# Patient Record
Sex: MALE
[De-identification: names, ages, dates, MRNs, and addresses within clinical notes are randomized per-mention and may not be internally consistent; named-entity substitution may affect disease eponyms.]

---

## 2018-03-13 ENCOUNTER — PRIOR ORIGINAL RECORDS (OUTPATIENT)
Dept: OTHER | Age: 68
End: 2018-03-13

## 2018-03-13 ENCOUNTER — HOSPITAL ENCOUNTER (EMERGENCY)
Facility: HOSPITAL | Age: 68
Discharge: HOME | End: 2018-03-13
Attending: EMERGENCY MEDICINE
Payer: MEDICARE

## 2018-03-13 VITALS
WEIGHT: 238 LBS | OXYGEN SATURATION: 93 % | TEMPERATURE: 97 F | HEIGHT: 69 IN | BODY MASS INDEX: 35.25 KG/M2 | HEART RATE: 72 BPM | RESPIRATION RATE: 18 BRPM | SYSTOLIC BLOOD PRESSURE: 130 MMHG | DIASTOLIC BLOOD PRESSURE: 89 MMHG

## 2018-03-13 DIAGNOSIS — I48.91 ATRIAL FIBRILLATION, NEW ONSET (HCC): Primary | ICD-10-CM

## 2018-03-13 LAB
ANION GAP SERPL CALC-SCNC: 10 MMOL/L (ref 0–18)
BUN SERPL-MCNC: 15 MG/DL (ref 8–20)
BUN/CREAT SERPL: 12.2 (ref 10–20)
CALCIUM SERPL-MCNC: 8.6 MG/DL (ref 8.5–10.5)
CHLORIDE SERPL-SCNC: 101 MMOL/L (ref 95–110)
CO2 SERPL-SCNC: 25 MMOL/L (ref 22–32)
CREAT SERPL-MCNC: 1.23 MG/DL (ref 0.5–1.5)
GLUCOSE SERPL-MCNC: 91 MG/DL (ref 70–99)
OSMOLALITY UR CALC.SUM OF ELEC: 282 MOSM/KG (ref 275–295)
POTASSIUM SERPL-SCNC: 3.9 MMOL/L (ref 3.3–5.1)
SODIUM SERPL-SCNC: 136 MMOL/L (ref 136–144)

## 2018-03-13 PROCEDURE — 93005 ELECTROCARDIOGRAM TRACING: CPT

## 2018-03-13 PROCEDURE — 85025 COMPLETE CBC W/AUTO DIFF WBC: CPT | Performed by: EMERGENCY MEDICINE

## 2018-03-13 PROCEDURE — 99284 EMERGENCY DEPT VISIT MOD MDM: CPT

## 2018-03-13 PROCEDURE — 93010 ELECTROCARDIOGRAM REPORT: CPT | Performed by: EMERGENCY MEDICINE

## 2018-03-13 PROCEDURE — 85060 BLOOD SMEAR INTERPRETATION: CPT | Performed by: EMERGENCY MEDICINE

## 2018-03-13 PROCEDURE — 36415 COLL VENOUS BLD VENIPUNCTURE: CPT

## 2018-03-13 PROCEDURE — 80048 BASIC METABOLIC PNL TOTAL CA: CPT | Performed by: EMERGENCY MEDICINE

## 2018-03-13 NOTE — ED INITIAL ASSESSMENT (HPI)
Pt reports he was sent here from his pmd for possible new onset of a-fib, pt has no complaints at this time other than being tired more than usual. He states that he has a uri and went to his pmd for a recheck where this diagnosed him with this new onset a

## 2018-03-14 LAB
BASOPHILS # BLD: 0.1 K/UL (ref 0–0.2)
BASOPHILS NFR BLD: 1 %
EOSINOPHIL # BLD: 0.5 K/UL (ref 0–0.7)
EOSINOPHIL NFR BLD: 4 %
ERYTHROCYTE [DISTWIDTH] IN BLOOD BY AUTOMATED COUNT: 13.7 % (ref 11–15)
HCT VFR BLD AUTO: 50.1 % (ref 41–52)
HGB BLD-MCNC: 16.8 G/DL (ref 13.5–17.5)
LYMPHOCYTES # BLD: 6.2 K/UL (ref 1–4)
LYMPHOCYTES NFR BLD: 46 %
MCH RBC QN AUTO: 31 PG (ref 27–32)
MCHC RBC AUTO-ENTMCNC: 33.5 G/DL (ref 32–37)
MCV RBC AUTO: 92.5 FL (ref 80–100)
MONOCYTES # BLD: 0.9 K/UL (ref 0–1)
MONOCYTES NFR BLD: 7 %
NEUTROPHILS # BLD AUTO: 5.8 K/UL (ref 1.8–7.7)
NEUTROPHILS NFR BLD: 43 %
PLATELET # BLD AUTO: 234 K/UL (ref 140–400)
PMV BLD AUTO: 8.7 FL (ref 7.4–10.3)
RBC # BLD AUTO: 5.41 M/UL (ref 4.5–5.9)
WBC # BLD AUTO: 13.5 K/UL (ref 4–11)

## 2018-03-14 NOTE — ED NOTES
Report received from otm Mae. Pt appears comfortable at this time. Pt denies chest pain, sob/dyspnea, palpitations, dizziness. Pt denies additional needs at this time. Pt states that he was sent from his primary care doctor after a routine checkup.  Will

## 2018-03-14 NOTE — CM/SW NOTE
Per Dr. John Amaro request pt provided with Xarelto coupon 30 day trial, pt verbalized understanding.

## 2018-03-14 NOTE — ED NOTES
Lab called with cbc differential being sent for path review, dr Naranjo Profit notified.  Neutrophil 43 lymphocyte 45.6 monocyte 6.9 eosinophil 3.8

## 2018-03-14 NOTE — ED PROVIDER NOTES
Patient Seen in: [facility] AND [facility]    History   Patient presents with:  Arrythmia/Palpitations (cardiovascular)      HPI    Patient presents to the ED after his doctor did an EKG at his clinic appointment today noted new onset atrial exhibits no discharge. Neck: No tracheal deviation present. Cardiovascular: Normal rate and intact distal pulses. Irregular rhythm   Pulmonary/Chest: Effort normal. No stridor. No respiratory distress. Abdominal: Soft. He exhibits no distension. fibrillation, with ventricular rate of 70    Differential Diagnosis/ Diagnostic Considerations: New onset atrial fibrillation    Medical Record Review: I personally reviewed available prior medical records for any recent pertinent discharge summaries, test

## 2018-03-15 LAB
BUN: 15 MG/DL
CALCIUM: 8.6 MG/DL
CHLORIDE: 101 MEQ/L
CREATININE, SERUM: 1.23 MG/DL
GLUCOSE: 91 MG/DL
HEMATOCRIT: 50.1 %
HEMOGLOBIN: 16.8 G/DL
PLATELETS: 234 K/UL
POTASSIUM, SERUM: 3.9 MEQ/L
RED BLOOD COUNT: 5.41 X 10-6/U
SODIUM: 136 MEQ/L
WHITE BLOOD COUNT: 13.5 X 10-3/U

## 2018-03-16 ENCOUNTER — MYAURORA ACCOUNT LINK (OUTPATIENT)
Dept: OTHER | Age: 68
End: 2018-03-16

## 2018-03-16 ENCOUNTER — PRIOR ORIGINAL RECORDS (OUTPATIENT)
Dept: OTHER | Age: 68
End: 2018-03-16

## 2018-03-28 ENCOUNTER — HOSPITAL ENCOUNTER (OUTPATIENT)
Dept: CV DIAGNOSTICS | Facility: HOSPITAL | Age: 68
Discharge: HOME | End: 2018-03-28
Attending: INTERNAL MEDICINE
Payer: MEDICARE

## 2018-03-28 ENCOUNTER — HOSPITAL ENCOUNTER (OUTPATIENT)
Dept: NUCLEAR MEDICINE | Facility: HOSPITAL | Age: 68
Discharge: HOME | End: 2018-03-28
Attending: INTERNAL MEDICINE
Payer: MEDICARE

## 2018-03-28 DIAGNOSIS — I10 HYPERTENSION: ICD-10-CM

## 2018-03-28 DIAGNOSIS — I48.91 ATRIAL FIBRILLATION (HCC): ICD-10-CM

## 2018-03-28 DIAGNOSIS — I48.19 ATRIAL FIBRILLATION, PERSISTENT (HCC): ICD-10-CM

## 2018-03-28 DIAGNOSIS — R06.00 DYSPNEA: ICD-10-CM

## 2018-03-28 PROCEDURE — 93306 TTE W/DOPPLER COMPLETE: CPT | Performed by: INTERNAL MEDICINE

## 2018-03-28 PROCEDURE — 78451 HT MUSCLE IMAGE SPECT SING: CPT | Performed by: INTERNAL MEDICINE

## 2018-03-28 RX ORDER — SODIUM CHLORIDE 9 MG/ML
INJECTION, SOLUTION INTRAVENOUS
Status: DISPENSED
Start: 2018-03-28 | End: 2018-03-29

## 2018-03-30 ENCOUNTER — PRIOR ORIGINAL RECORDS (OUTPATIENT)
Dept: OTHER | Age: 68
End: 2018-03-30

## 2018-04-06 ENCOUNTER — PRIOR ORIGINAL RECORDS (OUTPATIENT)
Dept: OTHER | Age: 68
End: 2018-04-06

## 2018-07-24 ENCOUNTER — PRIOR ORIGINAL RECORDS (OUTPATIENT)
Dept: OTHER | Age: 68
End: 2018-07-24

## 2018-10-10 ENCOUNTER — PRIOR ORIGINAL RECORDS (OUTPATIENT)
Dept: OTHER | Age: 68
End: 2018-10-10

## 2018-10-12 ENCOUNTER — PRIOR ORIGINAL RECORDS (OUTPATIENT)
Dept: OTHER | Age: 68
End: 2018-10-12

## 2018-10-12 ENCOUNTER — MYAURORA ACCOUNT LINK (OUTPATIENT)
Dept: OTHER | Age: 68
End: 2018-10-12

## 2018-10-16 ENCOUNTER — HOSPITAL ENCOUNTER (OUTPATIENT)
Dept: ULTRASOUND IMAGING | Facility: HOSPITAL | Age: 68
Discharge: HOME | End: 2018-10-16
Attending: INTERNAL MEDICINE
Payer: MEDICARE

## 2018-10-16 DIAGNOSIS — R94.31 ABNORMAL EKG: ICD-10-CM

## 2018-10-16 DIAGNOSIS — I10 BENIGN HYPERTENSION: ICD-10-CM

## 2018-10-16 DIAGNOSIS — R60.9 EDEMA, UNSPECIFIED TYPE: ICD-10-CM

## 2018-10-16 DIAGNOSIS — I73.9 CLAUDICATION, INTERMITTENT (HCC): ICD-10-CM

## 2018-10-16 PROCEDURE — 93971 EXTREMITY STUDY: CPT | Performed by: INTERNAL MEDICINE

## 2018-10-17 ENCOUNTER — PRIOR ORIGINAL RECORDS (OUTPATIENT)
Dept: OTHER | Age: 68
End: 2018-10-17

## 2018-10-18 ENCOUNTER — APPOINTMENT (OUTPATIENT)
Dept: NUCLEAR MEDICINE | Facility: HOSPITAL | Age: 68
End: 2018-10-18
Attending: INTERNAL MEDICINE
Payer: MEDICARE

## 2018-10-18 ENCOUNTER — HOSPITAL ENCOUNTER (OUTPATIENT)
Dept: NUCLEAR MEDICINE | Facility: HOSPITAL | Age: 68
Discharge: HOME | End: 2018-10-19
Attending: INTERNAL MEDICINE
Payer: MEDICARE

## 2018-10-18 ENCOUNTER — HOSPITAL ENCOUNTER (OUTPATIENT)
Dept: CV DIAGNOSTICS | Facility: HOSPITAL | Age: 68
Discharge: HOME | End: 2018-10-18
Attending: INTERNAL MEDICINE
Payer: MEDICARE

## 2018-10-18 DIAGNOSIS — R60.9 EDEMA: ICD-10-CM

## 2018-10-18 DIAGNOSIS — R94.31 ABNORMAL ELECTROCARDIOGRAM: ICD-10-CM

## 2018-10-18 DIAGNOSIS — I73.9 CLAUDICATION, INTERMITTENT (HCC): ICD-10-CM

## 2018-10-18 PROCEDURE — 78452 HT MUSCLE IMAGE SPECT MULT: CPT | Performed by: INTERNAL MEDICINE

## 2018-10-18 PROCEDURE — 93017 CV STRESS TEST TRACING ONLY: CPT | Performed by: INTERNAL MEDICINE

## 2018-10-19 ENCOUNTER — HOSPITAL ENCOUNTER (OUTPATIENT)
Dept: CV DIAGNOSTICS | Facility: HOSPITAL | Age: 68
Discharge: HOME | End: 2018-10-19
Attending: INTERNAL MEDICINE
Payer: MEDICARE

## 2018-10-19 PROCEDURE — 93018 CV STRESS TEST I&R ONLY: CPT | Performed by: INTERNAL MEDICINE

## 2018-10-19 PROCEDURE — 78452 HT MUSCLE IMAGE SPECT MULT: CPT | Performed by: INTERNAL MEDICINE

## 2018-10-19 PROCEDURE — A4216 STERILE WATER/SALINE, 10 ML: HCPCS

## 2018-10-19 PROCEDURE — 93016 CV STRESS TEST SUPVJ ONLY: CPT | Performed by: INTERNAL MEDICINE

## 2018-10-19 PROCEDURE — 93017 CV STRESS TEST TRACING ONLY: CPT | Performed by: INTERNAL MEDICINE

## 2018-10-19 RX ORDER — 0.9 % SODIUM CHLORIDE 0.9 %
VIAL (ML) INJECTION
Status: DISPENSED
Start: 2018-10-19 | End: 2018-10-20

## 2018-10-19 RX ORDER — 0.9 % SODIUM CHLORIDE 0.9 %
VIAL (ML) INJECTION
Status: DISCONTINUED
Start: 2018-10-19 | End: 2018-10-19 | Stop reason: WASHOUT

## 2018-10-23 ENCOUNTER — PRIOR ORIGINAL RECORDS (OUTPATIENT)
Dept: OTHER | Age: 68
End: 2018-10-23

## 2018-10-26 ENCOUNTER — PRIOR ORIGINAL RECORDS (OUTPATIENT)
Dept: OTHER | Age: 68
End: 2018-10-26

## 2018-10-26 ENCOUNTER — APPOINTMENT (OUTPATIENT)
Dept: LAB | Facility: HOSPITAL | Age: 68
End: 2018-10-26
Attending: INTERNAL MEDICINE
Payer: MEDICARE

## 2018-10-26 DIAGNOSIS — I48.91 ATRIAL FIBRILLATION, UNSPECIFIED TYPE (HCC): ICD-10-CM

## 2018-10-26 PROCEDURE — 36415 COLL VENOUS BLD VENIPUNCTURE: CPT

## 2018-10-26 PROCEDURE — 80048 BASIC METABOLIC PNL TOTAL CA: CPT

## 2018-10-29 LAB
BUN: 15 MG/DL
CALCIUM: 9.1 MG/DL
CHLORIDE: 101 MEQ/L
CREATININE, SERUM: 1.1 MG/DL
GLUCOSE: 133 MG/DL
POTASSIUM, SERUM: 3.4 MEQ/L
SODIUM: 137 MEQ/L

## 2018-11-06 ENCOUNTER — PRIOR ORIGINAL RECORDS (OUTPATIENT)
Dept: OTHER | Age: 68
End: 2018-11-06

## 2018-11-06 RX ORDER — METOPROLOL SUCCINATE 50 MG/1
50 TABLET, EXTENDED RELEASE ORAL DAILY
COMMUNITY

## 2018-11-06 RX ORDER — AMLODIPINE BESYLATE 10 MG/1
10 TABLET ORAL DAILY
COMMUNITY

## 2018-11-06 RX ORDER — LOSARTAN POTASSIUM AND HYDROCHLOROTHIAZIDE 25; 100 MG/1; MG/1
1 TABLET ORAL DAILY
COMMUNITY

## 2018-11-07 ENCOUNTER — PRIOR ORIGINAL RECORDS (OUTPATIENT)
Dept: OTHER | Age: 68
End: 2018-11-07

## 2018-11-07 ENCOUNTER — HOSPITAL ENCOUNTER (OUTPATIENT)
Dept: INTERVENTIONAL RADIOLOGY/VASCULAR | Facility: HOSPITAL | Age: 68
Discharge: HOME | End: 2018-11-07
Attending: INTERNAL MEDICINE | Admitting: INTERNAL MEDICINE
Payer: MEDICARE

## 2018-11-07 VITALS
HEART RATE: 61 BPM | SYSTOLIC BLOOD PRESSURE: 121 MMHG | HEIGHT: 70 IN | WEIGHT: 251 LBS | OXYGEN SATURATION: 98 % | RESPIRATION RATE: 19 BRPM | BODY MASS INDEX: 35.93 KG/M2 | DIASTOLIC BLOOD PRESSURE: 87 MMHG

## 2018-11-07 DIAGNOSIS — I48.91 A-FIB (HCC): ICD-10-CM

## 2018-11-07 PROCEDURE — 93005 ELECTROCARDIOGRAM TRACING: CPT

## 2018-11-07 PROCEDURE — 5A2204Z RESTORATION OF CARDIAC RHYTHM, SINGLE: ICD-10-PCS | Performed by: INTERNAL MEDICINE

## 2018-11-07 PROCEDURE — 92960 CARDIOVERSION ELECTRIC EXT: CPT

## 2018-11-07 PROCEDURE — 93010 ELECTROCARDIOGRAM REPORT: CPT | Performed by: INTERNAL MEDICINE

## 2018-11-07 PROCEDURE — 84132 ASSAY OF SERUM POTASSIUM: CPT | Performed by: INTERNAL MEDICINE

## 2018-11-07 RX ORDER — SODIUM CHLORIDE 0.9 % (FLUSH) 0.9 %
10 SYRINGE (ML) INJECTION AS NEEDED
Status: DISCONTINUED | OUTPATIENT
Start: 2018-11-07 | End: 2018-11-07

## 2018-11-07 RX ORDER — SODIUM CHLORIDE 9 MG/ML
INJECTION, SOLUTION INTRAVENOUS
Status: COMPLETED
Start: 2018-11-07 | End: 2018-11-07

## 2018-11-07 RX ORDER — SODIUM CHLORIDE 9 MG/ML
INJECTION, SOLUTION INTRAVENOUS CONTINUOUS
Status: DISCONTINUED | OUTPATIENT
Start: 2018-11-07 | End: 2018-11-07

## 2018-11-07 RX ADMIN — SODIUM CHLORIDE: 9 INJECTION, SOLUTION INTRAVENOUS at 08:30:00

## 2018-11-07 NOTE — PROGRESS NOTES
Patient and his grandaughter given discharge and follow-up instructions. All questions answered. Patient denies complaints. Patient VS stable, tolerating PO, and ambulating without difficulty.

## 2018-11-07 NOTE — INTERVAL H&P NOTE
Pre-op Diagnosis: * No pre-op diagnosis entered *    The above referenced H&P was reviewed by Julia Kat MD on 11/7/2018, the patient was examined and no significant changes have occurred in the patient's condition since the H&P was performed.   I discusse

## 2018-11-07 NOTE — PROCEDURES
PROCEDURE(S) PERFORMED:    1. Cardioversion. 2.     Sedation     :  Renetta Conley MD    ANESTHESIA:  IV sedation. Moderate conscious sedation for this procedure was administered and personally monitored from 9:30 until 9:37.      INDICATION:  Per

## 2018-11-08 LAB — POTASSIUM, SERUM: 3.6 MEQ/L

## 2018-11-15 ENCOUNTER — OFFICE VISIT (OUTPATIENT)
Dept: CARDIOLOGY CLINIC | Facility: HOSPITAL | Age: 68
End: 2018-11-15
Attending: INTERNAL MEDICINE
Payer: MEDICARE

## 2018-11-15 VITALS
BODY MASS INDEX: 36 KG/M2 | SYSTOLIC BLOOD PRESSURE: 123 MMHG | DIASTOLIC BLOOD PRESSURE: 66 MMHG | HEART RATE: 52 BPM | WEIGHT: 253 LBS | OXYGEN SATURATION: 97 %

## 2018-11-15 DIAGNOSIS — I48.19 ATRIAL FIBRILLATION, PERSISTENT (HCC): Primary | Chronic | ICD-10-CM

## 2018-11-15 DIAGNOSIS — F17.200 TOBACCO USE DISORDER: ICD-10-CM

## 2018-11-15 DIAGNOSIS — Z98.890 HISTORY OF CARDIOVERSION: ICD-10-CM

## 2018-11-15 DIAGNOSIS — I10 HTN (HYPERTENSION), BENIGN: ICD-10-CM

## 2018-11-15 DIAGNOSIS — I48.91 ATRIAL FIBRILLATION (HCC): ICD-10-CM

## 2018-11-15 DIAGNOSIS — Z71.6 ENCOUNTER FOR TOBACCO USE CESSATION COUNSELING: ICD-10-CM

## 2018-11-15 PROBLEM — I25.10 CORONARY ARTERY DISEASE INVOLVING NATIVE CORONARY ARTERY OF NATIVE HEART WITHOUT ANGINA PECTORIS: Chronic | Status: ACTIVE | Noted: 2018-11-15

## 2018-11-15 PROBLEM — I21.4 NSTEMI (NON-ST ELEVATED MYOCARDIAL INFARCTION) (HCC): Chronic | Status: ACTIVE | Noted: 2018-11-15

## 2018-11-15 PROBLEM — Z92.89 HISTORY OF CARDIOVERSION: Status: ACTIVE | Noted: 2018-11-07

## 2018-11-15 PROBLEM — I73.9 CLAUDICATION OF BOTH LOWER EXTREMITIES (HCC): Chronic | Status: ACTIVE | Noted: 2018-11-15

## 2018-11-15 PROCEDURE — 93010 ELECTROCARDIOGRAM REPORT: CPT | Performed by: NURSE PRACTITIONER

## 2018-11-15 PROCEDURE — 99214 OFFICE O/P EST MOD 30 MIN: CPT | Performed by: NURSE PRACTITIONER

## 2018-11-15 PROCEDURE — 99212 OFFICE O/P EST SF 10 MIN: CPT | Performed by: NURSE PRACTITIONER

## 2018-11-15 PROCEDURE — 93005 ELECTROCARDIOGRAM TRACING: CPT

## 2018-11-15 RX ORDER — NICOTINE 21 MG/24HR
1 PATCH, TRANSDERMAL 24 HOURS TRANSDERMAL EVERY 24 HOURS
Qty: 14 PATCH | Refills: 0 | Status: SHIPPED | OUTPATIENT
Start: 2018-11-15

## 2018-11-15 RX ORDER — ICOSAPENT ETHYL 1000 MG/1
1 CAPSULE ORAL DAILY
COMMUNITY

## 2018-11-15 RX ORDER — ATORVASTATIN CALCIUM 40 MG/1
40 TABLET, FILM COATED ORAL NIGHTLY
COMMUNITY

## 2018-11-15 RX ORDER — CHLORAL HYDRATE 500 MG
1 CAPSULE ORAL DAILY
COMMUNITY

## 2018-11-15 NOTE — PATIENT INSTRUCTIONS
Stop smoking and begin nicotine patch 21 m/24 hr patch daily for 2 weeks then decrease to nicotine 14 mg/24 hr patch for 2-4 weeks then nicotine 7 mg/24 hr patch for 2-4 weeks.      Continue all your same medications    Call if having any dizziness, lighthe corazón puede bombear hacia el cuerpo. La fibrilación auricular puede ocurrir jaz vez cada tanto e irse por sí tracie. O puede continuar por períodos [employer] y requerir tratamiento.   La fibrilación auricular puede causar problemas graves, tales kvng un a Iris Han son los síntomas de la fibrilación auricular? La fibrilación auricular puede causar síntomas o no.  Cuando hay síntomas, pueden incluir:  · Latidos irregulares, más kevin o más rápidos que lo normal  · Dificultad para respirar  · [place] incluir lo siguiente:  · Coágulos de gerardo  · Ataque cerebral  · Carlos Eduardo Grooms. Joyce problema se da cuando el músculo del corazón se debilita tanto que ya no puede bombear theresa la gerardo  ¿Cuándo marquis llamar a mi proveedor de [facility]?   Ll agregada  Evite comer:  · Fiambres o embutidos curados o ahumados  · Queso  · Jugo de tomate y ketchup  · Vegetales, pescado y sopas de gualberto que no indiquen que son reducidos en sodio o que no tienen sal agregada  · Salsas empacadas  · [place], pickles y de alimentación. · Coma menos grasas saturadas y grasas trans. Las grasas saturadas elevan shantel niveles de colesterol, por eso, coma lo menos posible de estas grasas.  Están en alimentos tales kvng las raudel grasas, la [place], el queso entero y los a menos grasas. Son buenas opciones el pescado, el charlene sin piel y los frijoles. · Productos lácteos bajos en grasa o sin grasa: ofrecen nutrientes sin mucha grasa. Pruebe consumir Suzy Smaller yogur bajos en grasa o sin grasa.   · Grasas saludables: pued aderezos sin grasa para ensaladas y vinagre. Si desea hierbas y especias sin yuly, pruebe chuckaca, cilkiaro, Isabel Los y rosenthal.   Date Last Reviewed: 6/1/2013  © 6743-1182 [employer]. [address] [facility], [address]12 [facility].  [place] obligan a latir muy rápido y de forma irregular. · Las aurículas laten tan rápido y de forma tan irregular que pueden simplemente temblar sin llegar a contraerse.  Si las aurículas no se contraen, no mueven suficiente [employer] cavidades inferiores especialice en ayudar a la gente a dejar de fumar.   · Consulte con matamoros médico acerca de los sustitutos de la nicotina y medicamentos con receta que pueden ayudarle a dejar de fumar.   Fije jaz fecha para dejarlo  · Elija jaz fecha en las [address] a cuat oxígeno [employer], ana parte del músculo muere. Todo matamoros cuerpo está en riesgo  La acumulación de placa puede generar problemas en todo matamoros cuerpo.  Los lugares donde comúnmente puede effie problemas con las arterias incluyen:  · El cerebro. Las arter

## 2018-11-15 NOTE — PROGRESS NOTES
[place] Patient Status:  Outpatient    1950 MRN [id number]   Location Daniel Cedeño MD, MD Cindy Schmid MD       Tamara Sanders is a 75year old male who presents to clinic Clinical weights: 1) 253  DC wt:  11.7 .18 @  251  Home weights:  1) not checking    General appearance: alert, appears stated age and cooperative  Neck: no JVD  Lungs: clear to auscultation bilaterally  Heart: S1, S2 normal, no murmur, click, rub or hernandez Call if having any dizziness, lightheadedness, heart racing, palpitations, chest pain, shortness of breath, coughing, swelling, weight gain, fever, chills or worsening symptoms.      Weigh yourself daily in the morning before breakfast, call if gaining 3 lb La fibrilación auricular puede causar problemas graves, tales kvng un ataque cerebral. Irene proveedor de atención médica deberá vigilarla y [facility]. ¿Qué sucede carol la fibrilación auricular?   El corazón posee un sistema eléctrico que envía señales par · Latidos irregulares, más kevin o más rápidos que lo normal  · Dificultad para respirar  · Cansancio  · Mareo o desmayos  · Dolor en el marcus  ¿Cómo se trata la fibrilación auricular?   Los tratamientos para la fibrilación auricular pueden incluir cualqui · [facility]. Joyce problema se da cuando el músculo del corazón se debilita tanto que ya no puede bombear theresa la gerardo  ¿Cuándo marquis llamar a mi proveedor de [facility]?   Llame a matamoros proveedor de [facility] de inmediato si nota algun · Queso  · Jugo de tomate y ketchup  · Vegetales, pescado y sopas de gualberto que no indiquen que son reducidos en sodio o que no tienen sal agregada  · Salsas empacadas  · [place], pickles y pepinillos en conserva  · Aderezos para ensalada embotellados  Par · Coma menos grasas saturadas y grasas trans. Las grasas saturadas elevan shantel niveles de colesterol, por eso, coma lo menos posible de estas grasas.  Están en alimentos tales kvng las raudel grasas, la [place], el queso entero y los aceites de guido y · Proteínas magras: le aportan nutrición con menos grasas. Son buenas opciones el pescado, el charlene sin piel y los frijoles. · Productos lácteos bajos en grasa o sin grasa: ofrecen nutrientes sin mucha grasa.  Pruebe consumir Clemon Moras o yogur bajos en · Elija ingredientes que le den sabor a shantel comidas sin cargarla con calorías, grasa o sodio. Pruebe los siguientes ingredientes: rábano picante, salsa picante, jose alfredo, mostaza, aderezos sin grasa para ensaladas y vinagre.  Si desea hierbas y especias sin sa · El aleteo auricular puede causar síntomas similares a los de la fibrilación auricular. Además, puede producir ritmos aun más rápidos e irregulares en la fibrilación auricular.   Fibrilación auricular  En la fibrilación auricular, las células de las aurícu Dejar de fumar es jaz de las mejores cosas que puede hacer para evitar que matamoros trastorno cardíaco se agrave.  Fumar disminuye el volumen de oxígeno que llega al corazón, acelera la acumulación de placa y aumenta el riesgo de un ataque al corazón, May lla © 7764-8396 [employer]. 1407 [facility], [address]12 [facility]. Todos los derechos reservados. Esta información no pretende sustituir la atención médica profesional. Sólo matamoros médico puede diagnosticar y tratar un problema de jerad. · Las piernas. Si las arterias de las piernas se obstruyen con placa, puede sentir calambres o sincere en los glúteos, los muslos o las pantorrillas al caminar.  A esto se le llama “claudicación”.   Date Last Reviewed: 6/1/2013  © 4581-0871 [employer]

## 2019-02-28 VITALS
WEIGHT: 238 LBS | SYSTOLIC BLOOD PRESSURE: 110 MMHG | DIASTOLIC BLOOD PRESSURE: 80 MMHG | BODY MASS INDEX: 34.07 KG/M2 | HEIGHT: 70 IN | OXYGEN SATURATION: 98 % | HEART RATE: 77 BPM

## 2019-02-28 VITALS
WEIGHT: 251 LBS | HEART RATE: 94 BPM | BODY MASS INDEX: 35.93 KG/M2 | DIASTOLIC BLOOD PRESSURE: 60 MMHG | HEIGHT: 70 IN | SYSTOLIC BLOOD PRESSURE: 116 MMHG

## 2019-08-21 RX ORDER — RIVAROXABAN 20 MG/1
TABLET, FILM COATED ORAL
Qty: 30 TABLET | Refills: 0 | Status: SHIPPED | OUTPATIENT
Start: 2019-08-21 | End: 2021-05-21 | Stop reason: CLARIF

## 2023-03-15 ENCOUNTER — OFFICE VISIT (OUTPATIENT)
Dept: SURGERY | Facility: CLINIC | Age: 73
End: 2023-03-15

## 2023-03-15 DIAGNOSIS — R31.0 GROSS HEMATURIA: Primary | ICD-10-CM

## 2023-03-15 PROCEDURE — 99203 OFFICE O/P NEW LOW 30 MIN: CPT | Performed by: UROLOGY

## 2023-03-16 LAB — NON GYNE INTERPRETATION: NEGATIVE

## 2023-03-17 ENCOUNTER — HOSPITAL ENCOUNTER (OUTPATIENT)
Dept: CT IMAGING | Age: 73
Discharge: HOME | End: 2023-03-17
Attending: UROLOGY
Payer: MEDICARE

## 2023-03-17 DIAGNOSIS — R31.0 GROSS HEMATURIA: ICD-10-CM

## 2023-03-17 LAB
CREAT BLD-MCNC: 1.4 MG/DL
GFR SERPLBLD BASED ON 1.73 SQ M-ARVRAT: 53 ML/MIN/1.73M2 (ref 60–?)

## 2023-03-17 PROCEDURE — 82565 ASSAY OF CREATININE: CPT

## 2023-03-17 PROCEDURE — 74178 CT ABD&PLV WO CNTR FLWD CNTR: CPT | Performed by: UROLOGY

## 2023-03-17 PROCEDURE — 76377 3D RENDER W/INTRP POSTPROCES: CPT | Performed by: UROLOGY

## 2023-03-21 ENCOUNTER — TELEPHONE (OUTPATIENT)
Dept: SURGERY | Facility: CLINIC | Age: 73
End: 2023-03-21

## 2023-03-21 ENCOUNTER — PROCEDURE (OUTPATIENT)
Dept: SURGERY | Facility: CLINIC | Age: 73
End: 2023-03-21

## 2023-03-21 VITALS — SYSTOLIC BLOOD PRESSURE: 116 MMHG | DIASTOLIC BLOOD PRESSURE: 80 MMHG | HEART RATE: 101 BPM

## 2023-03-21 DIAGNOSIS — Z51.81 MONITORING FOR ANTICOAGULANT USE: ICD-10-CM

## 2023-03-21 DIAGNOSIS — Z01.818 PREOP EXAMINATION: ICD-10-CM

## 2023-03-21 DIAGNOSIS — R31.0 GROSS HEMATURIA: Primary | ICD-10-CM

## 2023-03-21 DIAGNOSIS — D49.4 BLADDER TUMOR: Primary | ICD-10-CM

## 2023-03-21 DIAGNOSIS — Z79.01 MONITORING FOR ANTICOAGULANT USE: ICD-10-CM

## 2023-03-21 DIAGNOSIS — R39.89 OTHER SYMPTOMS AND SIGNS INVOLVING THE GENITOURINARY SYSTEM: ICD-10-CM

## 2023-03-21 PROCEDURE — 52000 CYSTOURETHROSCOPY: CPT | Performed by: UROLOGY

## 2023-03-21 NOTE — PROCEDURES
CYSTOSCOPY (MALE)    PRE-OP DIAGNOSIS: hematuria    POST-OP DIAGNOSIS: stefano    PROCEDURE: Cystsocopy    SURGEON: Garima Orellana MD    ASSISTANT: none     EBL: minimal    FINDINGS:   1. Urethra: No meatal stenosis, no anterior or posterior urethral strictures, open bladder neck, papillary lesion emerging from the bladder neck/proximal urethra into the bladder   2. Prostate: Bilobar coaptation, minimal  3. Bladder: Bilateral ureteral orifices in orthotopic position with efflux, no suspicious or concerning erythematous lesions, no papillary bladder tumors, no stones other than papillary lesion emerging from the bladder neck/proximal urethra into the bladder   4. Retroflexion: papillary lesion emerging from the bladder neck/proximal urethra into the bladder   5. Other findings: none    INDICATIONS: gross hematuria. Presents for cysto. CTU with irregular lesion on posterior bladder wall. Cytology benign. PROCEDURE: Patient was brought to the procedure suite and a timeout was performed identifying the patient,  and procedure being performed. The risks of the procedure were once again detailed to the patient including bleeding, infection, dysuria. The patient agreed to proceed. The patient had a negative urinalysis. No antibiotics were given to patient prior to this procedure. He was placed in a supine position on the table and a flexible cystoscope was inserted per urethra. There were no obvious urethral strictures in the anterior, membranous or posterior urethra. The prostate appeared small and nonobstructive. Once in the bladder we performed a full diagnostic/surveillance cystoscopy which demonstrated papillary lesion emerging from the bladder neck/proximal urethra into the bladder. On retroflexion we noted the same lesion. The scope was then carefully removed and once again no urethral abnormalities were noted.     There were no complications after this procedure and the patient tolerated the procedure without issue. IMPRESSION: cysto with papillary lesion emerging from the bladder neck/proximal urethra into the bladder . CTU with filling defect at the bladder neck. Cytology benign. PLAN:    1. OR: TURBT, fulguration  2. CBC, CHEM7, INR, urine culture  3. Hold xarelto, PCP clearance    Discussed risks of procedure including bleeding, infection, damage to surrounding structures (urethra, bladder, penis etc), need for additional procedures, need for prolonged catheter, bladder spasms/pain, anesthesia complications, sepsis, death, infection.

## 2023-03-21 NOTE — TELEPHONE ENCOUNTER
Hi!  This patient needs a TURBT. Should be booked for 1.5 hours in the next 1-2 weeks. Needs labs as ordered below. Needs PCP clerance with instructions on holding his Xarelto. Thanks! [address] [place] [address]    Urology Surgery Scheduling Request    Location: [address] [place] [address]    Surgeon: Claritza Vargas MD    Asst. Surgeon: N/A    Diagnosis: Bladder tumor    Procedure: Cystoscopy, transurethral resection of bladder tumor, fulguration    Anesthesia: General     Time Frame: next 1-2 weeks    Time needed: 1.5 hours    Special Equipment: bipolar resectoscope    On Call to OR: ancef 2g    Admission: Day Surgery    Pre-op Testing: CBC, CMP, PT/INR and Urine Culture     Need Pre-op Clearance: PCP clearance -needs instructions on when to hold xarelto    Estimated Post Op/Follow Up Appt: kita Acosta MD

## 2023-03-27 NOTE — TELEPHONE ENCOUNTER
Spoke with patient' daughter Дмитрий Rogers, scheduled Cystoscopy, transurethral resection of bladder tumor, fulguration, Thursday 04/20/2023    I will mail pre-op/lab instructions out, once received, they will call office, please transfer call to [phone], thanks anju      Faxed clearance to Dwayne Isidro, asking how long patient can hold Xarelto, prior to scheduled surgery, I will await recommendations.

## 2023-03-27 NOTE — ADDENDUM NOTE
Addended by: Deepthi [facility] on: 3/27/2023 02:15 PM     Modules accepted: Orders

## 2023-04-06 ENCOUNTER — TELEPHONE (OUTPATIENT)
Dept: SURGERY | Facility: CLINIC | Age: 73
End: 2023-04-06

## 2023-04-06 DIAGNOSIS — D49.4 BLADDER TUMOR: Primary | ICD-10-CM

## 2023-04-06 NOTE — TELEPHONE ENCOUNTER
Spoke with patient' daughter Lisa Edwards, moved surgery from Thursday 04/20/2023, Tuesday 04/11/2023, patient agreeable. Received clearance from rocael arreguin to hold xarelto 2-3 days prior to surgery, resume as soon as possible. Will fax to pre-admit for review.

## 2023-04-07 ENCOUNTER — LAB ENCOUNTER (OUTPATIENT)
Dept: LAB | Age: 73
End: 2023-04-07
Attending: UROLOGY
Payer: MEDICARE

## 2023-04-07 DIAGNOSIS — R31.0 GROSS HEMATURIA: ICD-10-CM

## 2023-04-07 LAB
ANION GAP SERPL CALC-SCNC: 5 MMOL/L (ref 0–18)
BUN BLD-MCNC: 16 MG/DL (ref 7–18)
BUN/CREAT SERPL: 12.6 (ref 10–20)
CALCIUM BLD-MCNC: 9.3 MG/DL (ref 8.5–10.1)
CHLORIDE SERPL-SCNC: 102 MMOL/L (ref 98–112)
CO2 SERPL-SCNC: 30 MMOL/L (ref 21–32)
CREAT BLD-MCNC: 1.27 MG/DL
DEPRECATED RDW RBC AUTO: 43.3 FL (ref 35.1–46.3)
ERYTHROCYTE [DISTWIDTH] IN BLOOD BY AUTOMATED COUNT: 13.3 % (ref 11–15)
FASTING STATUS PATIENT QL REPORTED: YES
GFR SERPLBLD BASED ON 1.73 SQ M-ARVRAT: 60 ML/MIN/1.73M2 (ref 60–?)
GLUCOSE BLD-MCNC: 117 MG/DL (ref 70–99)
HCT VFR BLD AUTO: 45.8 %
HGB BLD-MCNC: 15.7 G/DL
INR BLD: 1.77 (ref 0.85–1.16)
MCH RBC QN AUTO: 30.5 PG (ref 26–34)
MCHC RBC AUTO-ENTMCNC: 34.3 G/DL (ref 31–37)
MCV RBC AUTO: 88.9 FL
OSMOLALITY SERPL CALC.SUM OF ELEC: 286 MOSM/KG (ref 275–295)
PLATELET # BLD AUTO: 194 10(3)UL (ref 150–450)
POTASSIUM SERPL-SCNC: 3.9 MMOL/L (ref 3.5–5.1)
PROTHROMBIN TIME: 20.5 SECONDS (ref 11.6–14.8)
RBC # BLD AUTO: 5.15 X10(6)UL
SODIUM SERPL-SCNC: 137 MMOL/L (ref 136–145)
WBC # BLD AUTO: 8.9 X10(3) UL (ref 4–11)

## 2023-04-07 PROCEDURE — 80048 BASIC METABOLIC PNL TOTAL CA: CPT

## 2023-04-07 PROCEDURE — 36415 COLL VENOUS BLD VENIPUNCTURE: CPT

## 2023-04-07 PROCEDURE — 85610 PROTHROMBIN TIME: CPT

## 2023-04-07 PROCEDURE — 85027 COMPLETE CBC AUTOMATED: CPT

## 2023-04-07 PROCEDURE — 87086 URINE CULTURE/COLONY COUNT: CPT

## 2023-04-07 RX ORDER — LOSARTAN POTASSIUM AND HYDROCHLOROTHIAZIDE 12.5; 1 MG/1; MG/1
1 TABLET ORAL EVERY MORNING
COMMUNITY
Start: 2023-03-04

## 2023-04-07 RX ORDER — TAMSULOSIN HYDROCHLORIDE 0.4 MG/1
0.4 CAPSULE ORAL EVERY MORNING
COMMUNITY
Start: 2023-02-01

## 2023-04-07 RX ORDER — FINASTERIDE 5 MG/1
5 TABLET, FILM COATED ORAL EVERY MORNING
COMMUNITY
Start: 2023-02-01

## 2023-04-07 RX ORDER — OMEPRAZOLE 40 MG/1
40 CAPSULE, DELAYED RELEASE ORAL EVERY MORNING
COMMUNITY

## 2023-04-10 NOTE — H&P
PRE-OP NOTE     NAME/MRN/PROCEDURE: Gary Moon [id number] - TURBT  HPI: 74-year-old man with a history of gross painless hematuria who was noted to have a negative cytology, CT urogram that demonstrated an irregular enhancement of the inferior bladder and cystoscopy demonstrating a papillary lesion emerging from the bladder neck/proximal urethra into the bladder. Discussed risks of procedure including bleeding, infection, damage to surrounding structures (urethra, bladder, penis etc), need for additional procedures, need for prolonged catheter, bladder spasms/pain, anesthesia complications, sepsis, death, infection.   PMH: Hypertension, hyperlipidemia  PSH: None  MEDS: Amlodipine, atorvastatin, losartan, metoprolol, nicotine, omeprazole, Xarelto (ensure patient has held his Xarelto 2-3 days)  ALL: No known allergies  LABS: Urine culture negative, INR 1.77, creatinine 1.27, hematocrit 45.8, platelets 523  IMAGING:       OTHER:   Physical exam  CONSTITUTIONAL: No apparent distress, cooperative and communicative  NEUROLOGIC: Alert and oriented   HEAD: Normocephalic, atraumatic   EYES: Sclera non-icteric   ENT: Hearing intact, moist mucous membranes   NECK: No obvious goiter or masses   RESPIRATORY: Normal respiratory effort, Nonlabored breathing on room air  SKIN: No evident rashes   ABDOMEN: Soft, nontender, nondistended, no rebound tenderness, no guarding, no masses  ABX: Ancef

## 2023-04-11 ENCOUNTER — ANESTHESIA EVENT (OUTPATIENT)
Dept: SURGERY | Facility: HOSPITAL | Age: 73
End: 2023-04-11
Payer: MEDICARE

## 2023-04-11 ENCOUNTER — HOSPITAL ENCOUNTER (OUTPATIENT)
Facility: HOSPITAL | Age: 73
Setting detail: HOSPITAL OUTPATIENT SURGERY
Discharge: HOME | End: 2023-04-11
Attending: UROLOGY | Admitting: UROLOGY
Payer: MEDICARE

## 2023-04-11 ENCOUNTER — ANESTHESIA (OUTPATIENT)
Dept: SURGERY | Facility: HOSPITAL | Age: 73
End: 2023-04-11
Payer: MEDICARE

## 2023-04-11 ENCOUNTER — TELEPHONE (OUTPATIENT)
Dept: SURGERY | Facility: CLINIC | Age: 73
End: 2023-04-11

## 2023-04-11 VITALS
BODY MASS INDEX: 33.05 KG/M2 | HEART RATE: 74 BPM | OXYGEN SATURATION: 92 % | RESPIRATION RATE: 16 BRPM | WEIGHT: 244 LBS | DIASTOLIC BLOOD PRESSURE: 59 MMHG | HEIGHT: 72 IN | SYSTOLIC BLOOD PRESSURE: 98 MMHG | TEMPERATURE: 97 F

## 2023-04-11 DIAGNOSIS — D49.4 BLADDER TUMOR: ICD-10-CM

## 2023-04-11 LAB
GLUCOSE BLDC GLUCOMTR-MCNC: 137 MG/DL (ref 70–99)
Q-T INTERVAL: 380 MS
QRS DURATION: 90 MS
QTC CALCULATION (BEZET): 452 MS
R AXIS: 71 DEGREES
T AXIS: 177 DEGREES
VENTRICULAR RATE: 85 BPM

## 2023-04-11 PROCEDURE — 0TBD8ZX EXCISION OF URETHRA, VIA NATURAL OR ARTIFICIAL OPENING ENDOSCOPIC, DIAGNOSTIC: ICD-10-PCS | Performed by: UROLOGY

## 2023-04-11 PROCEDURE — 0TBC8ZX EXCISION OF BLADDER NECK, VIA NATURAL OR ARTIFICIAL OPENING ENDOSCOPIC, DIAGNOSTIC: ICD-10-PCS | Performed by: UROLOGY

## 2023-04-11 PROCEDURE — 52234 CYSTOSCOPY AND TREATMENT: CPT | Performed by: UROLOGY

## 2023-04-11 RX ORDER — HYDROMORPHONE HYDROCHLORIDE 1 MG/ML
0.6 INJECTION, SOLUTION INTRAMUSCULAR; INTRAVENOUS; SUBCUTANEOUS EVERY 5 MIN PRN
Status: DISCONTINUED | OUTPATIENT
Start: 2023-04-11 | End: 2023-04-11

## 2023-04-11 RX ORDER — HYDROCODONE BITARTRATE AND ACETAMINOPHEN 5; 325 MG/1; MG/1
1 TABLET ORAL ONCE AS NEEDED
Status: DISCONTINUED | OUTPATIENT
Start: 2023-04-11 | End: 2023-04-11

## 2023-04-11 RX ORDER — ONDANSETRON 2 MG/ML
INJECTION INTRAMUSCULAR; INTRAVENOUS AS NEEDED
Status: DISCONTINUED | OUTPATIENT
Start: 2023-04-11 | End: 2023-04-11 | Stop reason: SURG

## 2023-04-11 RX ORDER — HYDROMORPHONE HYDROCHLORIDE 1 MG/ML
0.4 INJECTION, SOLUTION INTRAMUSCULAR; INTRAVENOUS; SUBCUTANEOUS EVERY 5 MIN PRN
Status: DISCONTINUED | OUTPATIENT
Start: 2023-04-11 | End: 2023-04-11

## 2023-04-11 RX ORDER — MORPHINE SULFATE 4 MG/ML
4 INJECTION, SOLUTION INTRAMUSCULAR; INTRAVENOUS EVERY 10 MIN PRN
Status: DISCONTINUED | OUTPATIENT
Start: 2023-04-11 | End: 2023-04-11

## 2023-04-11 RX ORDER — SODIUM CHLORIDE, SODIUM LACTATE, POTASSIUM CHLORIDE, CALCIUM CHLORIDE 600; 310; 30; 20 MG/100ML; MG/100ML; MG/100ML; MG/100ML
INJECTION, SOLUTION INTRAVENOUS CONTINUOUS
Status: DISCONTINUED | OUTPATIENT
Start: 2023-04-11 | End: 2023-04-11

## 2023-04-11 RX ORDER — ACETAMINOPHEN 500 MG
1000 TABLET ORAL ONCE
Status: COMPLETED | OUTPATIENT
Start: 2023-04-11 | End: 2023-04-11

## 2023-04-11 RX ORDER — ROCURONIUM BROMIDE 10 MG/ML
INJECTION, SOLUTION INTRAVENOUS AS NEEDED
Status: DISCONTINUED | OUTPATIENT
Start: 2023-04-11 | End: 2023-04-11 | Stop reason: SURG

## 2023-04-11 RX ORDER — LIDOCAINE HYDROCHLORIDE 10 MG/ML
INJECTION, SOLUTION EPIDURAL; INFILTRATION; INTRACAUDAL; PERINEURAL AS NEEDED
Status: DISCONTINUED | OUTPATIENT
Start: 2023-04-11 | End: 2023-04-11 | Stop reason: SURG

## 2023-04-11 RX ORDER — MORPHINE SULFATE 4 MG/ML
2 INJECTION, SOLUTION INTRAMUSCULAR; INTRAVENOUS EVERY 10 MIN PRN
Status: DISCONTINUED | OUTPATIENT
Start: 2023-04-11 | End: 2023-04-11

## 2023-04-11 RX ORDER — GLYCOPYRROLATE 0.2 MG/ML
INJECTION, SOLUTION INTRAMUSCULAR; INTRAVENOUS AS NEEDED
Status: DISCONTINUED | OUTPATIENT
Start: 2023-04-11 | End: 2023-04-11 | Stop reason: SURG

## 2023-04-11 RX ORDER — NALOXONE HYDROCHLORIDE 0.4 MG/ML
80 INJECTION, SOLUTION INTRAMUSCULAR; INTRAVENOUS; SUBCUTANEOUS AS NEEDED
Status: DISCONTINUED | OUTPATIENT
Start: 2023-04-11 | End: 2023-04-11

## 2023-04-11 RX ORDER — DEXTROSE MONOHYDRATE 25 G/50ML
50 INJECTION, SOLUTION INTRAVENOUS
Status: DISCONTINUED | OUTPATIENT
Start: 2023-04-11 | End: 2023-04-11

## 2023-04-11 RX ORDER — SULFAMETHOXAZOLE AND TRIMETHOPRIM 800; 160 MG/1; MG/1
1 TABLET ORAL 2 TIMES DAILY
Qty: 6 TABLET | Refills: 0 | Status: SHIPPED | OUTPATIENT
Start: 2023-04-11 | End: 2023-04-14

## 2023-04-11 RX ORDER — NICOTINE POLACRILEX 4 MG
15 LOZENGE BUCCAL
Status: DISCONTINUED | OUTPATIENT
Start: 2023-04-11 | End: 2023-04-11

## 2023-04-11 RX ORDER — ACETAMINOPHEN 500 MG
1000 TABLET ORAL ONCE AS NEEDED
Status: DISCONTINUED | OUTPATIENT
Start: 2023-04-11 | End: 2023-04-11

## 2023-04-11 RX ORDER — HYDROMORPHONE HYDROCHLORIDE 1 MG/ML
0.2 INJECTION, SOLUTION INTRAMUSCULAR; INTRAVENOUS; SUBCUTANEOUS EVERY 5 MIN PRN
Status: DISCONTINUED | OUTPATIENT
Start: 2023-04-11 | End: 2023-04-11

## 2023-04-11 RX ORDER — METOCLOPRAMIDE 10 MG/1
10 TABLET ORAL ONCE
Status: COMPLETED | OUTPATIENT
Start: 2023-04-11 | End: 2023-04-11

## 2023-04-11 RX ORDER — FAMOTIDINE 20 MG/1
20 TABLET, FILM COATED ORAL ONCE
Status: COMPLETED | OUTPATIENT
Start: 2023-04-11 | End: 2023-04-11

## 2023-04-11 RX ORDER — MORPHINE SULFATE 10 MG/ML
6 INJECTION, SOLUTION INTRAMUSCULAR; INTRAVENOUS EVERY 10 MIN PRN
Status: DISCONTINUED | OUTPATIENT
Start: 2023-04-11 | End: 2023-04-11

## 2023-04-11 RX ORDER — NICOTINE POLACRILEX 4 MG
15 LOZENGE BUCCAL
Status: DISCONTINUED | OUTPATIENT
Start: 2023-04-11 | End: 2023-04-11 | Stop reason: HOSPADM

## 2023-04-11 RX ORDER — ONDANSETRON 2 MG/ML
4 INJECTION INTRAMUSCULAR; INTRAVENOUS EVERY 6 HOURS PRN
Status: DISCONTINUED | OUTPATIENT
Start: 2023-04-11 | End: 2023-04-11

## 2023-04-11 RX ORDER — HYDROCODONE BITARTRATE AND ACETAMINOPHEN 5; 325 MG/1; MG/1
2 TABLET ORAL ONCE AS NEEDED
Status: DISCONTINUED | OUTPATIENT
Start: 2023-04-11 | End: 2023-04-11

## 2023-04-11 RX ORDER — DEXTROSE MONOHYDRATE 25 G/50ML
50 INJECTION, SOLUTION INTRAVENOUS
Status: DISCONTINUED | OUTPATIENT
Start: 2023-04-11 | End: 2023-04-11 | Stop reason: HOSPADM

## 2023-04-11 RX ORDER — CEFAZOLIN SODIUM/WATER 2 G/20 ML
2 SYRINGE (ML) INTRAVENOUS ONCE
Status: COMPLETED | OUTPATIENT
Start: 2023-04-11 | End: 2023-04-11

## 2023-04-11 RX ORDER — NICOTINE POLACRILEX 4 MG
30 LOZENGE BUCCAL
Status: DISCONTINUED | OUTPATIENT
Start: 2023-04-11 | End: 2023-04-11

## 2023-04-11 RX ORDER — NICOTINE POLACRILEX 4 MG
30 LOZENGE BUCCAL
Status: DISCONTINUED | OUTPATIENT
Start: 2023-04-11 | End: 2023-04-11 | Stop reason: HOSPADM

## 2023-04-11 RX ADMIN — ROCURONIUM BROMIDE 20 MG: 10 INJECTION, SOLUTION INTRAVENOUS at 10:12:00

## 2023-04-11 RX ADMIN — LIDOCAINE HYDROCHLORIDE 50 MG: 10 INJECTION, SOLUTION EPIDURAL; INFILTRATION; INTRACAUDAL; PERINEURAL at 10:05:00

## 2023-04-11 RX ADMIN — SODIUM CHLORIDE, SODIUM LACTATE, POTASSIUM CHLORIDE, CALCIUM CHLORIDE: 600; 310; 30; 20 INJECTION, SOLUTION INTRAVENOUS at 10:49:00

## 2023-04-11 RX ADMIN — GLYCOPYRROLATE 0.2 MG: 0.2 INJECTION, SOLUTION INTRAMUSCULAR; INTRAVENOUS at 10:05:00

## 2023-04-11 RX ADMIN — CEFAZOLIN SODIUM/WATER 2 G: 2 G/20 ML SYRINGE (ML) INTRAVENOUS at 10:05:00

## 2023-04-11 RX ADMIN — SODIUM CHLORIDE, SODIUM LACTATE, POTASSIUM CHLORIDE, CALCIUM CHLORIDE: 600; 310; 30; 20 INJECTION, SOLUTION INTRAVENOUS at 10:05:00

## 2023-04-11 RX ADMIN — ROCURONIUM BROMIDE 10 MG: 10 INJECTION, SOLUTION INTRAVENOUS at 10:05:00

## 2023-04-11 RX ADMIN — ONDANSETRON 4 MG: 2 INJECTION INTRAMUSCULAR; INTRAVENOUS at 10:05:00

## 2023-04-11 NOTE — ANESTHESIA PROCEDURE NOTES
Airway  Date/Time: 4/11/2023 10:06 AM  Urgency: Elective    Airway not difficult    General Information and Staff    Patient location during procedure: OR  Anesthesiologist: Janessa Doe MD  Performed: anesthesiologist   Performed by: Janessa Doe MD  Authorized by: Janessa Doe MD      Indications and Patient Condition  Indications for airway management: anesthesia  Sedation level: deep  Preoxygenated: yes  Patient position: sniffing  Mask difficulty assessment: 1 - vent by mask    Final Airway Details  Final airway type: endotracheal airway      Successful airway: ETT  Cuffed: yes   Successful intubation technique: direct laryngoscopy  Endotracheal tube insertion site: oral  Blade: GlideScope  Blade size: #4  ETT size (mm): 7.5    Cormack-Lehane Classification: grade I - full view of glottis  Placement verified by: chest auscultation and capnometry   Cuff volume (mL): 10  Measured from: lips  ETT to lips (cm): 22  Number of attempts at approach: 1

## 2023-04-11 NOTE — TELEPHONE ENCOUNTER
This patient needs a follow up for pathology discussion and a nursing visit for voiding trial same day (before the path discussion) in 7-10 days

## 2023-04-11 NOTE — OPERATIVE REPORT
OPERATIVE REPORT  DATE OF SURGERY: 4/11/2023  PREOPERATIVE DIAGNOSIS: Bladder tumor  POSTOPERATIVE DIAGNOSIS: Prostatic urethral tumor  PROCEDURE: Cystoscopy, transurethral resection of bladder tumor 2cm  SURGEON: Tano Bangura MD  ASSISTANT: none  ANESTHESIA: General ET tube  FLUIDS: Per anesthesia  URINE OUTPUT: Not applicable  ESTIMATED BLOOD LOSS: 3cc  SPECIMENS:   1. Bulk of prostatic urethral tumor  2. Prostatic urethral tumor, deep  CONDITION: Stable to PACU    INDICATIONS: 41-year-old man with a history of gross painless hematuria who was noted to have a negative cytology, CT urogram that demonstrated an irregular enhancement of the inferior bladder and cystoscopy demonstrating a papillary lesion emerging from the bladder neck/proximal urethra into the bladder. Discussed risks of procedure including bleeding, infection, damage to surrounding structures (urethra, bladder, penis etc), need for additional procedures, need for prolonged catheter, bladder spasms/pain, anesthesia complications, sepsis, death, infection. PROCEDURE: The patient was taken to the operating room and given general anesthesia and then placed in yellowfin stirrups. Patient received Ancef antibiotic coverage before starting the case. At this point, sounds were used to dilate the meatus to [address] [place]. The resectoscope passed easily in the meatus however a narrow membranous urethra was encountered. We passed a wire андрей the bladder and gently dilated the urethra with the scope and placed the resectoscope into the bladder. A full cystoscopy was performed which demonstrated no bladder abnormalities, but a large 2cm prostatic urethral tumor close to the bladder neck. We then used the resectocope loop to resect the bladder tumor in its entirity. We used the loop to fulgurate the area completely. With the bladder decompressed there was no further bleeding. The specimens were removed and sent to pathology. A deep specimen was obtained. A van catheter was placed the urine was flushed and was light pink in color. The patient was then awakened and transported to the PACU in good condition. IMPRESSION: Status post TURBT    PLAN:  1. Van catheter x 10 days  2.  RTC at that time for pathology discussion 99

## 2023-04-13 NOTE — TELEPHONE ENCOUNTER
Spoke with patient used Language Emely Colón: [id number] Assisted patient with scheduling apt for 04/24/2024 @ 3pm

## 2023-04-18 ENCOUNTER — TELEPHONE (OUTPATIENT)
Dept: SURGERY | Facility: CLINIC | Age: 73
End: 2023-04-18

## 2023-04-18 NOTE — TELEPHONE ENCOUNTER
I called pt with the assistance of Mora Spence # [id number] and determined that pt has c/o pain in the penis from the van cath from his surgery on 4/11. States that the urine is slightly cloudy yellow in color and the cath is secured in the stat lock and draining well. I apologized for his discomfort but explained that he needs to keep the cath till he sees AR to allow for healing of his surgical site. I suggested that pt push as much fluids as he can tolerate and take the Tylenol ES 2 tabs every 6 hrs for pain. Pt verbalized understanding and compliance.

## 2023-04-18 NOTE — TELEPHONE ENCOUNTER
Patients daughter Illa Hebrew calling for patient that is in a lot of pain. Patient has appointment on 4/24 and requesting sooner appointment. Please call with  at [phone],ISSAC.

## 2023-04-24 ENCOUNTER — OFFICE VISIT (OUTPATIENT)
Dept: SURGERY | Facility: CLINIC | Age: 73
End: 2023-04-24

## 2023-04-24 DIAGNOSIS — C67.9 MALIGNANT NEOPLASM OF URINARY BLADDER, UNSPECIFIED SITE (HCC): Primary | ICD-10-CM

## 2023-04-24 PROCEDURE — 99213 OFFICE O/P EST LOW 20 MIN: CPT | Performed by: UROLOGY

## 2023-04-24 NOTE — PROGRESS NOTES
Cala Buerger asked we remove patients catheter and assist in scheduling patient for 3 month cystoscopy. PT aware to complete cytology before cystoscopy appointment. I removed patients catheter and scheduled cystoscopy. Patient and son confirmed and verbalized understanding.      Future Appointments   Date Time Provider Aleshia Beltrán   8/15/2023  3:30 PM Juan Pablo [facility]áles [facility]

## 2023-04-25 ENCOUNTER — TELEPHONE (OUTPATIENT)
Dept: SURGERY | Facility: CLINIC | Age: 73
End: 2023-04-25

## 2023-04-25 NOTE — TELEPHONE ENCOUNTER
Patients daughter calling to get a few questions answered after appointment yesterday 04/24. Daughter speaks Indonesian.   Please advise

## 2023-04-27 NOTE — TELEPHONE ENCOUNTER
Patients daughter Eloy Cardona returning call. Please call with  at [phone] call after 4PM due to work, thanks.

## 2023-08-02 ENCOUNTER — TELEPHONE (OUTPATIENT)
Dept: SURGERY | Facility: CLINIC | Age: 73
End: 2023-08-02

## 2023-08-02 NOTE — TELEPHONE ENCOUNTER
Patients daughter calling back again. Gibraltarian speaking.  Requesting augsust 25 due to not being able to find tickets from [place] until around end of August. Please advise

## 2023-08-03 NOTE — TELEPHONE ENCOUNTER
Spoke with patient's daughter, Nayla Lozoya, and I assisted in rescheduling procedure for 08/25/23 at 10:00amn and she agreed.

## 2023-08-22 ENCOUNTER — TELEPHONE (OUTPATIENT)
Dept: SURGERY | Facility: CLINIC | Age: 73
End: 2023-08-22

## 2023-08-22 NOTE — TELEPHONE ENCOUNTER
Spoke with patients son. I advised him that order for Cytology fluids was placed on April 24th. I advised him that this is the urine test that  ordered and that the order is active. I advised him that he can go to any [facility] lab and they should be able to pull up the order. He states he will call back if he has any issues. ERNESTO's if central scheduling of patient calls regarding order, please transfer to [facility] ext: [phone].      Future Appointments   Date Time Provider Aleshia Beltrán   8/25/2023 10:00 AM [facility] Pe[facility]

## 2023-08-22 NOTE — TELEPHONE ENCOUNTER
Per son-in-law took pt to the [place] location to complete his urine test he needs to complete today and they do not have the order.  Please advise

## 2023-08-23 ENCOUNTER — LAB ENCOUNTER (OUTPATIENT)
Dept: LAB | Age: 73
End: 2023-08-23
Attending: UROLOGY
Payer: MEDICARE

## 2023-08-23 DIAGNOSIS — C67.9 MALIGNANT NEOPLASM OF URINARY BLADDER, UNSPECIFIED SITE (HCC): ICD-10-CM

## 2023-08-23 PROCEDURE — 88108 CYTOPATH CONCENTRATE TECH: CPT

## 2023-08-25 ENCOUNTER — PROCEDURE (OUTPATIENT)
Dept: SURGERY | Facility: CLINIC | Age: 73
End: 2023-08-25

## 2023-08-25 VITALS — SYSTOLIC BLOOD PRESSURE: 122 MMHG | HEART RATE: 68 BPM | DIASTOLIC BLOOD PRESSURE: 80 MMHG

## 2023-08-25 DIAGNOSIS — C67.5 MALIGNANT NEOPLASM OF URINARY BLADDER NECK (HCC): Primary | ICD-10-CM

## 2023-08-25 PROCEDURE — 52000 CYSTOURETHROSCOPY: CPT | Performed by: UROLOGY

## 2023-08-25 RX ORDER — CIPROFLOXACIN 500 MG/1
500 TABLET, FILM COATED ORAL ONCE
Status: COMPLETED | OUTPATIENT
Start: 2023-08-25 | End: 2023-08-25

## 2023-08-25 RX ADMIN — CIPROFLOXACIN 500 MG: 500 TABLET, FILM COATED ORAL at 10:36:00

## 2023-08-28 LAB — NON GYNE INTERPRETATION: NEGATIVE

## 2023-08-29 ENCOUNTER — TELEPHONE (OUTPATIENT)
Dept: SURGERY | Facility: CLINIC | Age: 73
End: 2023-08-29

## 2023-12-15 ENCOUNTER — TELEPHONE (OUTPATIENT)
Dept: SURGERY | Facility: CLINIC | Age: 73
End: 2023-12-15

## 2023-12-15 NOTE — TELEPHONE ENCOUNTER
- utilizing  Radha #[id number] LM on  for pt to call us at [phone] to reschedule his cystoscopy as he missed his appt today.

## 2023-12-15 NOTE — TELEPHONE ENCOUNTER
Hi all he was a no show for a his bladder cancer surveillance cystoscopy  Please call and reschedule  Emphasize the importance of follow up    He also needs a cytology prior

## 2024-04-10 ENCOUNTER — PROCEDURE (OUTPATIENT)
Dept: SURGERY | Facility: CLINIC | Age: 74
End: 2024-04-10

## 2024-04-10 VITALS — SYSTOLIC BLOOD PRESSURE: 126 MMHG | HEART RATE: 106 BPM | DIASTOLIC BLOOD PRESSURE: 84 MMHG

## 2024-04-10 DIAGNOSIS — C67.9 MALIGNANT NEOPLASM OF URINARY BLADDER, UNSPECIFIED SITE (HCC): Primary | ICD-10-CM

## 2024-04-10 PROCEDURE — 52000 CYSTOURETHROSCOPY: CPT | Performed by: UROLOGY

## 2024-04-10 RX ORDER — CIPROFLOXACIN 500 MG/1
500 TABLET, FILM COATED ORAL ONCE
Status: COMPLETED | OUTPATIENT
Start: 2024-04-10 | End: 2024-04-10

## 2024-04-10 RX ADMIN — CIPROFLOXACIN 500 MG: 500 TABLET, FILM COATED ORAL at 16:09:00

## 2024-04-10 NOTE — PROCEDURES
CYSTOSCOPY (MALE)    PRE-OP DIAGNOSIS: LgTa    POST-OP DIAGNOSIS: same    PROCEDURE: Cystsocopy    SURGEON: Elizabeth Slade MD    ASSISTANT: none     EBL: minimal    FINDINGS:   Urethra: No meatal stenosis, no anterior or posterior urethral strictures, open bladder neck   Prostate: Bilobar coaptation with mild intravesical component   Bladder: Bilateral ureteral orifices in orthotopic position with efflux, no suspicious or concerning erythematous lesions, no papillary bladder tumors, no stones   Retroflexion: Mild intravesical component of prostate   Other findings: none    INDICATIONS: Low-grade TA bladder neck urothelial carcinoma recommend cystoscopy and cytology in 3 months     PROCEDURE: Patient was brought to the procedure suite and a timeout was performed identifying the patient,  and procedure being performed.  The risks of the procedure were once again detailed to the patient including bleeding, infection, dysuria.  The patient agreed to proceed.  The patient had a negative urinalysis.  No antibiotics were given to patient prior to this procedure.     He was placed in a supine position on the table and a flexible cystoscope was inserted per urethra.  There were no obvious urethral strictures in the anterior, membranous or posterior urethra.  The prostate appeared enlarged.  Once in the bladder we performed a full diagnostic/surveillance cystoscopy which demonstrated no abnormalities.  On retroflexion we noted no abnormalities.  The scope was then carefully removed and once again no urethral abnormalities were noted.    There were no complications after this procedure and the patient tolerated the procedure without issue.    IMPRESSION: cysto negative    PLAN:    Cysto + cytology in 3 months  He is going to [place] in 6 months and will be scheduling his cysto in 6 months, not 3  Will message with cytology results

## 2024-04-10 NOTE — PROGRESS NOTES
Offered pt to schedule next cystoscopy in 6 months but pt stated he will call our office to schedule next cysto once he was back from [place].

## 2024-04-11 ENCOUNTER — TELEPHONE (OUTPATIENT)
Dept: SURGERY | Facility: CLINIC | Age: 74
End: 2024-04-11

## 2024-04-11 LAB — NON GYNE INTERPRETATION: NEGATIVE

## 2024-04-11 NOTE — TELEPHONE ENCOUNTER
LMTCB    ----- Message from Elizabeth Slade MD sent at 4/11/2024 11:46 AM CDT -----  Please let him know that his cytology is negative

## (undated) DEVICE — NON-ADHERENT PAD PREPACK: Brand: TELFA

## (undated) DEVICE — SOLUTION  .9 3000ML

## (undated) DEVICE — PLUG CATH CAP DRN STRL LF

## (undated) DEVICE — BAG DRAIN INFECTION CNTRL 2000

## (undated) DEVICE — GAMMEX® PI HYBRID SIZE 6, STERILE POWDER-FREE SURGICAL GLOVE, POLYISOPRENE AND NEOPRENE BLEND: Brand: GAMMEX

## (undated) DEVICE — MAT TRANSFER HALFMATS 39 X 49

## (undated) DEVICE — CATH URTH BDX IC 20FR FL 3

## (undated) DEVICE — STERILE WATER 1000ML BTL

## (undated) DEVICE — NCOMPASS, NITINOL STONE EXTRACTOR 4 WIRE BASKET: Brand: NCOMPASS

## (undated) DEVICE — CYSTO PACK: Brand: MEDLINE INDUSTRIES, INC.

## (undated) DEVICE — CUTTING LOOP, BIPOLAR 24/26FR LOGITUDINL: Brand: N.A.

## (undated) DEVICE — SOLO FLEX HYBRID GUIDEWIRE .03

## (undated) NOTE — LETTER
[facility], [place]  Authorization for Invasive Procedures  1.  I hereby authorize  Dr Edvin Shi  my physician and whomever may be designated as the doctor's assistant, to perform the following operation and/or procedure:  Monica Ha performed for the purposes of advancing medicine, science, and/or education, provided my identity is not revealed. If the procedure has been videotaped, the physician/surgeon will obtain the original videotape.  The hospital will not be responsible for stor My signature below affirms that prior to the time of the procedure, I have explained to the patient and/or his legal representative, the risks and benefits involved in the proposed treatment and any reasonable alternative to the proposed treatment.  I have

## (undated) NOTE — ED AVS SNAPSHOT
Priscilla Bear   MRN: [id number]    Department:  [facility] Department   Date of Visit:  3/13/2018           Disclosure     Insurance plans vary and the physician(s) referred by the ER may not be covered by your plan.  Please contact your i within the next three months to obtain basic health screening including reassessment of your blood pressure.     IF THERE IS ANY CHANGE OR WORSENING OF YOUR CONDITION, CALL YOUR PRIMARY CARE PHYSICIAN AT ONCE OR RETURN IMMEDIATELY TO THE EMERGENCY DEPARTMEN